# Patient Record
Sex: MALE | Race: WHITE | URBAN - NONMETROPOLITAN AREA
[De-identification: names, ages, dates, MRNs, and addresses within clinical notes are randomized per-mention and may not be internally consistent; named-entity substitution may affect disease eponyms.]

---
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
## 2021-03-07 ENCOUNTER — HOSPITAL ENCOUNTER (EMERGENCY)
Dept: HOSPITAL 49 - FER | Age: 21
Discharge: HOME | End: 2021-03-07
Payer: COMMERCIAL

## 2021-03-07 DIAGNOSIS — I10: ICD-10-CM

## 2021-03-07 DIAGNOSIS — F17.290: ICD-10-CM

## 2021-03-07 DIAGNOSIS — K29.70: Primary | ICD-10-CM

## 2021-03-07 LAB
ALBUMIN SERPL-MCNC: 4.4 G/DL (ref 3.4–5)
ALKALINE PHOSHATASE: 63 U/L (ref 46–116)
ALT SERPL-CCNC: 29 U/L (ref 16–63)
AMORPHOUS PHOSPHATE CRYSTALS: (no result)
AST: 18 U/L (ref 15–37)
BASOPHIL: 0.4 % (ref 0–2)
BILIRUBIN - TOTAL: 1.8 MG/DL (ref 0.2–1)
BILIRUBIN: NEGATIVE MG/DL
BLOOD: NEGATIVE ERY/UL
BUN SERPL-MCNC: 14 MG/DL (ref 7–18)
BUN/CREAT RATIO (CALC): 18.2 RATIO
CHLORIDE: 96 MMOL/L (ref 98–107)
CK SERPL-CCNC: 291 U/L (ref 39–308)
CLARITY UR: CLEAR
CO2 (BICARBONATE): 25 MMOL/L (ref 21–32)
COLOR: YELLOW
CREATININE: 0.77 MG/DL (ref 0.67–1.17)
EOSINOPHIL: 0.1 % (ref 0–5)
GLOBULIN (CALCULATION): 4.1 G/DL
GLUCOSE (U): NORMAL MG/DL
GLUCOSE SERPL-MCNC: 97 MG/DL (ref 74–106)
HCT: 49.1 % (ref 42–52)
HGB BLD-MCNC: 18.1 G/DL (ref 13.2–18)
LEUKOCYTES: NEGATIVE LEU/UL
LIPASE: 96 U/L (ref 73–393)
LYMPHOCYTE: 22 % (ref 15–48)
MCH RBC QN AUTO: 32 PG (ref 25–31)
MCHC RBC AUTO-ENTMCNC: 36.9 G/DL (ref 32–36)
MCV: 86.9 FL (ref 78–100)
MONOCYTE: 9.5 % (ref 0–12)
MPV: 9.1 FL (ref 6–9.5)
NEUTROPHIL: 67.8 % (ref 41–80)
NITRITE: NEGATIVE MG/DL
NRBC: 0
PLT: 289 K/UL (ref 150–400)
POTASSIUM: 3.4 MMOL/L (ref 3.5–5.1)
PROTEIN: (no result) MG/DL
RBC MORPHOLOGY: NORMAL
RBC: 5.65 M/UL (ref 4.7–6)
RDW: 12.1 % (ref 11.5–14)
SPECIFIC GRAVITY: 1.01 (ref 1–1.03)
TOTAL PROTEIN: 8.5 G/DL (ref 6.4–8.2)
UROBILINOGEN: 0.2 MG/DL (ref 0.2–1)
WBC: 8.3 K/UL (ref 4–10.5)

## 2021-05-18 NOTE — PROGRESS NOTES
Jeff Queen [birth year]EREMY 2000     Office/Outpatient Visit    Visit Date: Mon, Oct 22, 2018 02:49 pm    Provider: Tee Lentz N.P. (Assistant: Clarissa Fernandez MA)    Location: [facility]        Electronically signed by Tee Lentz N.P. on  10/22/2018 05:03:50 PM                             SUBJECTIVE:        CC:     Mr. Queen is a 18 year old White male.  He is here today following a transition of care from the emergency department ( [facility] on 10-19-18 for blood in urine, put on keflex 500mg q6h; pt here today for low back pain ).          HPI:     Here to follow up on blood in his urine, went to [place] ER 10/19 with gross blood in urine and back pain. Was treated with Keflex and blood in urine has started to clear up. However back pain has not changed. taking Motrin without improvement in low back pain that radiates down his right leg. Denies injury or fall related to back pain and denies fever, or problems with urinary incontinence or ED.     ROS:     CONSTITUTIONAL:  Negative for chills, fatigue, fever and weight change.      CARDIOVASCULAR:  Negative for chest pain, orthopnea, paroxysmal nocturnal dyspnea and pedal edema.      RESPIRATORY:  Negative for dyspnea and cough.      GASTROINTESTINAL:  Negative for abdominal pain, heartburn, constipation, diarrhea, and stool changes.      GENITOURINARY:  Positive for hematuria (seen in ER , treated with Keflex for UTI, some improvement since).      MUSCULOSKELETAL:  Positive for low back pain , started about 1 week ago, improved with staying active , denies any falls or back injuries.      PSYCHIATRIC:  Negative for anxiety and depression.          PM/FM/:     Last Reviewed on 10/22/2018 03:18 PM by Tee Lentz    Past Medical History:             PAST MEDICAL HISTORY     UNREMARKABLE         Surgical History:         Hernia Repair: left inguinal;      Pressure Equalization Tubes: multiple times;         Family History:      Father: Hypertension     Mother: Hypertension; Hyperlipidemia;  Hypothyroidism     Paternal Grandmother: Lung Cancer     Maternal great grandmother- Leukemia         Social History:     Parents' Marital Status:      Household:  Lives with his parents.      He is exposed to second hand smoke thru tobacco use by both parents.      Currently in High School. ( [employer]; sophomore )     Participates in [employer].          Tobacco/Alcohol/Supplements:     Last Reviewed on 10/22/2018 03:18 PM by Tee Lentz    Tobacco: Current Smoker: He currently smokes every day, E-Cigarette.              Current Problems:     Last Reviewed on 10/22/2018 03:18 PM by Tee Lentz    History of hematuria         Immunizations:     None        Allergies:     Last Reviewed on 10/22/2018 03:18 PM by Tee Lentz      No Known Drug Allergies.         Current Medications:     Last Reviewed on 10/22/2018 03:18 PM by Tee Lentz    None        OBJECTIVE:        Vitals:         Current: 10/22/2018 2:56:04 PM    Ht:  5 ft, 10.5 in (65.01%);  Wt: 180.2 lbs (85.30%);  BMI: 25.5    T: 98 F (oral);  BP: 136/74 mm Hg (left arm, sitting);  P: 81 bpm (left arm (BP Cuff), sitting)        Exams:     PHYSICAL EXAM:     GENERAL: Vitals recorded well developed, well nourished;  well groomed;  no apparent distress;     RESPIRATORY: normal respiratory rate and pattern with no distress; normal breath sounds with no rales, rhonchi, wheezes or rubs;     CARDIOVASCULAR: normal rate; rhythm is regular;  normal S1; normal S2; no systolic murmur; no cyanosis; no edema;     GENITOURINARY: No CVA tenderness;     MUSCULOSKELETAL: L2-4 region back tenderness with tenderness going down right leg to knee , FROM, DTR wnl, SLR Right positive at 30 degrees, Left SLR neg;     NEUROLOGICAL:  cranial nerves, motor and sensory function, reflexes, gait and coordination are all intact;     PSYCHIATRIC:  appropriate affect and demeanor; normal speech  pattern; grossly normal memory;         ASSESSMENT:           V13.09   R31.2  History of hematuria              DDx:     724.2   M54.16  Lower back pain              DDx:         ORDERS:         Meds Prescribed:       Baclofen 10mg Tablet 1 tab po TID prn for pain/muscle pain  #60 (Sixty) tablet(s) Refills: 1       Meloxicam 15mg Tablet 1 tablet daily with food x 1 month then stop  #30 (Thirty) tablet(s) Refills: 0         Procedures Ordered:       REFER  Referral to Specialist or Other Facility  (Send-Out)                   PLAN:          History of hematuria Repeat Urine in 2 weeks , dmt         REFERRALS:  Referral initiated to a urologist ( [facility] ).            Orders:       REFER  Referral to Specialist or Other Facility  (Send-Out)            Lower back pain If not improving with PT may need MRI lumbar back dmt         REFERRALS:  Referral initiated to physical therapy ( [facility] ).            Prescriptions:       Baclofen 10mg Tablet 1 tab po TID prn for pain/muscle pain  #60 (Sixty) tablet(s) Refills: 1       Meloxicam 15mg Tablet 1 tablet daily with food x 1 month then stop  #30 (Thirty) tablet(s) Refills: 0             CHARGE CAPTURE:           Primary Diagnosis:     V13.09 History of hematuria            R31.2    Other microscopic hematuria              Orders:          [id number]   Office/outpatient visit; established patient, level 4  (In-House)           724.2 Lower back pain            M54.16    Radiculopathy, lumbar region        ADDENDUMS:      ____________________________________    Addendum: 10/23/2018 04:12 PM - Lilli Wright         Visit Note Faxed to:        Domingo Flores  (Surgery, Urological); Number [phone]

## 2021-07-01 VITALS
DIASTOLIC BLOOD PRESSURE: 74 MMHG | HEIGHT: 71 IN | WEIGHT: 180.2 LBS | TEMPERATURE: 98 F | SYSTOLIC BLOOD PRESSURE: 136 MMHG | BODY MASS INDEX: 25.23 KG/M2 | HEART RATE: 81 BPM

## 2021-07-15 ENCOUNTER — HOSPITAL ENCOUNTER (EMERGENCY)
Dept: HOSPITAL 49 - FER | Age: 21
Discharge: HOME | End: 2021-07-15
Payer: COMMERCIAL

## 2021-07-15 DIAGNOSIS — F41.9: Primary | ICD-10-CM

## 2021-07-15 DIAGNOSIS — F17.290: ICD-10-CM

## 2021-07-15 LAB
ALBUMIN SERPL-MCNC: 4.2 G/DL (ref 3.4–5)
ALKALINE PHOSHATASE: 51 U/L (ref 46–116)
ALT SERPL-CCNC: 17 U/L (ref 16–63)
AMPHETAMINES: NEGATIVE
AST: 12 U/L (ref 15–37)
BARBITURATES: NEGATIVE
BASOPHIL: 1.4 % (ref 0–2)
BILIRUBIN - TOTAL: 0.6 MG/DL (ref 0.2–1)
BILIRUBIN: NEGATIVE MG/DL
BLOOD: NEGATIVE ERY/UL
BUN SERPL-MCNC: 11 MG/DL (ref 7–18)
BUN/CREAT RATIO (CALC): 12.5 RATIO
CHLORIDE: 103 MMOL/L (ref 98–107)
CLARITY UR: CLEAR
CO2 (BICARBONATE): 30 MMOL/L (ref 21–32)
COLOR: YELLOW
CREATININE: 0.88 MG/DL (ref 0.67–1.17)
ECSTASY (MDMA): NEGATIVE
EOSINOPHIL: 7.7 % (ref 0–5)
GLOBULIN (CALCULATION): 3.3 G/DL
GLUCOSE (U): NORMAL MG/DL
GLUCOSE SERPL-MCNC: 81 MG/DL (ref 74–106)
HCT: 45.9 % (ref 42–52)
HGB BLD-MCNC: 15.6 G/DL (ref 13.2–18)
LEUKOCYTES: NEGATIVE LEU/UL
LYMPHOCYTE: 43.2 % (ref 15–48)
MARIJUANA (THC): NEGATIVE
MCH RBC QN AUTO: 31.3 PG (ref 25–31)
MCHC RBC AUTO-ENTMCNC: 34 G/DL (ref 32–36)
MCV: 92.2 FL (ref 78–100)
METHADONE: NEGATIVE
MONOCYTE: 8.6 % (ref 0–12)
MPV: 9.1 FL (ref 6–9.5)
NEUTROPHIL: 38.9 % (ref 41–80)
NITRITE: NEGATIVE MG/DL
NRBC: 0
OPIATES: NEGATIVE
OXYCODONE: NEGATIVE
PLT: 221 K/UL (ref 150–400)
POTASSIUM: 3.9 MMOL/L (ref 3.5–5.1)
PROTEIN: NEGATIVE MG/DL
RBC MORPHOLOGY: NORMAL
RBC: 4.98 M/UL (ref 4.7–6)
RDW: 12 % (ref 11.5–14)
SPECIFIC GRAVITY: 1.01 (ref 1–1.03)
TOTAL PROTEIN: 7.5 G/DL (ref 6.4–8.2)
UROBILINOGEN: 0.2 MG/DL (ref 0.2–1)
WBC: 9 K/UL (ref 4–10.5)

## 2024-01-29 ENCOUNTER — OFFICE VISIT (OUTPATIENT)
Dept: FAMILY MEDICINE CLINIC | Age: 24
End: 2024-01-29
Payer: COMMERCIAL

## 2024-01-29 VITALS
HEIGHT: 71 IN | SYSTOLIC BLOOD PRESSURE: 137 MMHG | DIASTOLIC BLOOD PRESSURE: 83 MMHG | HEART RATE: 115 BPM | WEIGHT: 265.2 LBS | TEMPERATURE: 98.3 F | OXYGEN SATURATION: 98 % | BODY MASS INDEX: 37.13 KG/M2

## 2024-01-29 DIAGNOSIS — M54.50 RIGHT LOW BACK PAIN, UNSPECIFIED CHRONICITY, UNSPECIFIED WHETHER SCIATICA PRESENT: ICD-10-CM

## 2024-01-29 DIAGNOSIS — K61.1 RECTAL ABSCESS: Primary | ICD-10-CM

## 2024-01-29 PROCEDURE — 99204 OFFICE O/P NEW MOD 45 MIN: CPT | Performed by: NURSE PRACTITIONER

## 2024-01-29 RX ORDER — BACLOFEN 10 MG/1
10 TABLET ORAL EVERY 8 HOURS PRN
Qty: 30 TABLET | Refills: 0 | Status: SHIPPED | OUTPATIENT
Start: 2024-01-29

## 2024-01-29 RX ORDER — SULFAMETHOXAZOLE AND TRIMETHOPRIM 800; 160 MG/1; MG/1
1 TABLET ORAL 2 TIMES DAILY
Qty: 20 TABLET | Refills: 0 | Status: SHIPPED | OUTPATIENT
Start: 2024-01-29

## 2024-01-29 RX ORDER — MELOXICAM 15 MG/1
15 TABLET ORAL DAILY
Qty: 30 TABLET | Refills: 0 | Status: SHIPPED | OUTPATIENT
Start: 2024-01-29 | End: 2024-02-28

## 2024-01-29 NOTE — PROGRESS NOTES
Chief Complaint  Jeff Queen presents to [facility] for Establish Care (Lower tail bone pain on both sides, 2 knots, bulging disc in 2018, pain started back 2 days ago)    Subjective          History of Present Illness    Carlos Alberto is here today for c/o tailbone area pain. Hurts worse when sitting. Pain started 2 days ago. He has not taken any medication for symptoms. Pain radiating down right leg. He reports that he can feel a knot. He previously hurt his back on a leg press back in 2017. Was told that he had bulging disc in 2018. Reports current symptoms feels similar to episode back in 2018. He was prescribed meloxicam and baclofen at that time with improvement.     Review of Systems      No Known Allergies   History reviewed. No pertinent past medical history.  Current Outpatient Medications   Medication Sig Dispense Refill    baclofen (LIORESAL) 10 MG tablet Take 1 tablet by mouth Every 8 (Eight) Hours As Needed for Muscle Spasms. 30 tablet 0    meloxicam (MOBIC) 15 MG tablet Take 1 tablet by mouth Daily for 30 days. 30 tablet 0    sulfamethoxazole-trimethoprim (Bactrim DS) 800-160 MG per tablet Take 1 tablet by mouth 2 (Two) Times a Day. 20 tablet 0     No current facility-administered medications for this visit.     Past Surgical History:   Procedure Laterality Date    EAR TUBES Bilateral     HERNIA REPAIR        Social History     Tobacco Use    Smoking status: Former     Types: Cigarettes     Quit date:      Years since quittin.0     Passive exposure: Past    Smokeless tobacco: Never   Vaping Use    Vaping Use: Every day    Substances: Nicotine, Flavoring    Devices: Disposable   Substance Use Topics    Alcohol use: Yes     Alcohol/week: 24.0 standard drinks of alcohol     Types: 24 Cans of beer per week    Drug use: Never     Family History   Problem Relation Age of Onset    Skin cancer Father     Glaucoma Sister     Lung cancer Paternal Grandmother   "    Health Maintenance Due   Topic Date Due    BMI FOLLOWUP  Never done    HEPATITIS C SCREENING  Never done    ANNUAL PHYSICAL  Never done        There is no immunization history on file for this patient.     Objective     Vitals:    01/29/24 1432   BP: 137/83   BP Location: Left arm   Patient Position: Sitting   Cuff Size: Large Adult   Pulse: 115   Temp: 98.3 °F (36.8 °C)   TempSrc: Oral   SpO2: 98%   Weight: 120 kg (265 lb 3.2 oz)   Height: 179.1 cm (70.5\")     Body mass index is 37.51 kg/m².     Class 2 Severe Obesity (BMI >=35 and <=39.9). Obesity-related health conditions include the following: none. Obesity is unchanged. BMI is is above average; BMI management plan is completed. We discussed Information on healthy weight added to patient's after visit summary.       Physical Exam  Vitals reviewed.   Constitutional:       General: He is not in acute distress.     Appearance: Normal appearance. He is well-developed.   HENT:      Head: Normocephalic and atraumatic.   Cardiovascular:      Rate and Rhythm: Normal rate and regular rhythm.   Pulmonary:      Effort: Pulmonary effort is normal.      Breath sounds: Normal breath sounds.   Genitourinary:     Comments: Chaperone declined.  Right side abscess on right upper inner butt cheek  Neurological:      Mental Status: He is alert and oriented to person, place, and time.   Psychiatric:         Mood and Affect: Mood and affect normal.           Result Review :                               Assessment and Plan      Diagnoses and all orders for this visit:    1. Rectal abscess (Primary)  -     sulfamethoxazole-trimethoprim (Bactrim DS) 800-160 MG per tablet; Take 1 tablet by mouth 2 (Two) Times a Day.  Dispense: 20 tablet; Refill: 0    2. Right low back pain, unspecified chronicity, unspecified whether sciatica present  -     meloxicam (MOBIC) 15 MG tablet; Take 1 tablet by mouth Daily for 30 days.  Dispense: 30 tablet; Refill: 0  -     baclofen (LIORESAL) 10 MG " tablet; Take 1 tablet by mouth Every 8 (Eight) Hours As Needed for Muscle Spasms.  Dispense: 30 tablet; Refill: 0      Will treat rectal abscess with antibiotics. May use warm compresses. Will also refill NSAIDs and muscle relaxers for back pain although the abscess is likely causing more of his pain at this time. ER precautions.         Follow Up     Return in about 3 weeks (around 2/19/2024).

## 2024-01-29 NOTE — LETTER
January 29, 2024     Patient: Jeff Queen   YOB: 2000   Date of Visit: 1/29/2024       To Whom It May Concern:    It is my medical opinion that Jeff Queen  be excused from work Monday January 29, 2024, Tuesday January 30, 2024, and Wednesday January 31, 2024 .           Sincerely,        SHANNEN Che    CC:   No Recipients

## 2025-05-20 ENCOUNTER — OFFICE VISIT (OUTPATIENT)
Dept: FAMILY MEDICINE CLINIC | Age: 25
End: 2025-05-20
Payer: COMMERCIAL

## 2025-05-20 VITALS
HEIGHT: 71 IN | DIASTOLIC BLOOD PRESSURE: 76 MMHG | SYSTOLIC BLOOD PRESSURE: 137 MMHG | TEMPERATURE: 98.3 F | RESPIRATION RATE: 20 BRPM | OXYGEN SATURATION: 99 % | HEART RATE: 104 BPM | BODY MASS INDEX: 38.27 KG/M2 | WEIGHT: 273.4 LBS

## 2025-05-20 DIAGNOSIS — I10 PRIMARY HYPERTENSION: Primary | ICD-10-CM

## 2025-05-20 RX ORDER — LISINOPRIL 10 MG/1
10 TABLET ORAL DAILY
Qty: 30 TABLET | Refills: 1 | Status: SHIPPED | OUTPATIENT
Start: 2025-05-20
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
## 2025-05-27 ENCOUNTER — TELEPHONE (OUTPATIENT)
Dept: FAMILY MEDICINE CLINIC | Age: 25
End: 2025-05-27
Payer: COMMERCIAL

## 2025-05-27 NOTE — TELEPHONE ENCOUNTER
Pt stated his bp was dropping too low with 10mg dose. 97/60s, he has been taking half tab and checking bp 1 hour after and it's running 130/90 average. F/u appt made with pcp for 6/10/25

## 2025-05-27 NOTE — TELEPHONE ENCOUNTER
----- Message from Cristina Ríos sent at 5/27/2025  9:38 AM EDT -----  Please call and ask how bp is doing. Farheen Keenan  ----- Message -----  From: Cristina Ríos APRN  Sent: 5/27/2025  12:00 AM EDT  To: SHANNEN Vega    Check on bp started on lisinopril not able to see pcp until july

## 2025-06-10 ENCOUNTER — OFFICE VISIT (OUTPATIENT)
Dept: FAMILY MEDICINE CLINIC | Age: 25
End: 2025-06-10
Payer: COMMERCIAL

## 2025-06-10 VITALS
HEART RATE: 99 BPM | OXYGEN SATURATION: 99 % | SYSTOLIC BLOOD PRESSURE: 143 MMHG | BODY MASS INDEX: 37.85 KG/M2 | DIASTOLIC BLOOD PRESSURE: 87 MMHG | WEIGHT: 270.4 LBS | TEMPERATURE: 98.1 F | HEIGHT: 71 IN

## 2025-06-10 DIAGNOSIS — R03.0 ELEVATED BLOOD PRESSURE READING: ICD-10-CM

## 2025-06-10 DIAGNOSIS — F41.9 ANXIETY: Primary | ICD-10-CM

## 2025-06-10 PROCEDURE — 99214 OFFICE O/P EST MOD 30 MIN: CPT | Performed by: NURSE PRACTITIONER

## 2025-06-10 RX ORDER — HYDROXYZINE HYDROCHLORIDE 10 MG/1
10 TABLET, FILM COATED ORAL EVERY 8 HOURS PRN
Qty: 60 TABLET | Refills: 0 | Status: SHIPPED | OUTPATIENT
Start: 2025-06-10
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
## 2025-07-09 ENCOUNTER — LAB (OUTPATIENT)
Dept: LAB | Facility: HOSPITAL | Age: 25
End: 2025-07-09
Payer: COMMERCIAL

## 2025-07-09 ENCOUNTER — OFFICE VISIT (OUTPATIENT)
Dept: FAMILY MEDICINE CLINIC | Age: 25
End: 2025-07-09
Payer: COMMERCIAL

## 2025-07-09 VITALS
OXYGEN SATURATION: 96 % | SYSTOLIC BLOOD PRESSURE: 136 MMHG | WEIGHT: 266.8 LBS | TEMPERATURE: 98.1 F | HEART RATE: 94 BPM | BODY MASS INDEX: 37.35 KG/M2 | HEIGHT: 71 IN | DIASTOLIC BLOOD PRESSURE: 87 MMHG

## 2025-07-09 DIAGNOSIS — Z00.00 ANNUAL PHYSICAL EXAM: ICD-10-CM

## 2025-07-09 DIAGNOSIS — Z00.00 ANNUAL PHYSICAL EXAM: Primary | ICD-10-CM

## 2025-07-09 DIAGNOSIS — F41.9 ANXIETY: ICD-10-CM

## 2025-07-09 LAB
ALBUMIN SERPL-MCNC: 4.3 G/DL (ref 3.5–5.2)
ALBUMIN/GLOB SERPL: 1.2 G/DL
ALP SERPL-CCNC: 96 U/L (ref 39–117)
ALT SERPL W P-5'-P-CCNC: 17 U/L (ref 1–41)
ANION GAP SERPL CALCULATED.3IONS-SCNC: 12.4 MMOL/L (ref 5–15)
AST SERPL-CCNC: 15 U/L (ref 1–40)
BILIRUB SERPL-MCNC: 0.4 MG/DL (ref 0–1.2)
BUN SERPL-MCNC: 13 MG/DL (ref 6–20)
BUN/CREAT SERPL: 13 (ref 7–25)
CALCIUM SPEC-SCNC: 9.6 MG/DL (ref 8.6–10.5)
CHLORIDE SERPL-SCNC: 101 MMOL/L (ref 98–107)
CO2 SERPL-SCNC: 24.6 MMOL/L (ref 22–29)
CREAT SERPL-MCNC: 1 MG/DL (ref 0.76–1.27)
DEPRECATED RDW RBC AUTO: 40.4 FL (ref 37–54)
EGFRCR SERPLBLD CKD-EPI 2021: 107.1 ML/MIN/1.73
ERYTHROCYTE [DISTWIDTH] IN BLOOD BY AUTOMATED COUNT: 12.1 % (ref 12.3–15.4)
GLOBULIN UR ELPH-MCNC: 3.5 GM/DL
GLUCOSE SERPL-MCNC: 86 MG/DL (ref 65–99)
HCT VFR BLD AUTO: 47.8 % (ref 37.5–51)
HGB BLD-MCNC: 16.2 G/DL (ref 13–17.7)
MCH RBC QN AUTO: 30.6 PG (ref 26.6–33)
MCHC RBC AUTO-ENTMCNC: 33.9 G/DL (ref 31.5–35.7)
MCV RBC AUTO: 90.2 FL (ref 79–97)
PLATELET # BLD AUTO: 284 10*3/MM3 (ref 140–450)
PMV BLD AUTO: 9 FL (ref 6–12)
POTASSIUM SERPL-SCNC: 4.1 MMOL/L (ref 3.5–5.2)
PROT SERPL-MCNC: 7.8 G/DL (ref 6–8.5)
RBC # BLD AUTO: 5.3 10*6/MM3 (ref 4.14–5.8)
SODIUM SERPL-SCNC: 138 MMOL/L (ref 136–145)
WBC NRBC COR # BLD AUTO: 8.56 10*3/MM3 (ref 3.4–10.8)

## 2025-07-09 PROCEDURE — 36415 COLL VENOUS BLD VENIPUNCTURE: CPT

## 2025-07-09 PROCEDURE — 85027 COMPLETE CBC AUTOMATED: CPT

## 2025-07-09 PROCEDURE — 99395 PREV VISIT EST AGE 18-39: CPT | Performed by: NURSE PRACTITIONER

## 2025-07-09 PROCEDURE — 80053 COMPREHEN METABOLIC PANEL: CPT
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
